# Patient Record
Sex: MALE | Race: WHITE | NOT HISPANIC OR LATINO | Employment: OTHER | URBAN - METROPOLITAN AREA
[De-identification: names, ages, dates, MRNs, and addresses within clinical notes are randomized per-mention and may not be internally consistent; named-entity substitution may affect disease eponyms.]

---

## 2017-01-01 ENCOUNTER — HOSPITAL ENCOUNTER (OUTPATIENT)
Dept: RADIOLOGY | Facility: HOSPITAL | Age: 82
Discharge: HOME | End: 2017-04-20
Attending: NURSE PRACTITIONER
Payer: MEDICARE

## 2017-01-01 ENCOUNTER — TELEPHONE (OUTPATIENT)
Dept: NEUROSURGERY | Facility: CLINIC | Age: 82
End: 2017-01-01

## 2017-01-01 ENCOUNTER — OFFICE VISIT (OUTPATIENT)
Dept: NEUROSURGERY | Facility: CLINIC | Age: 82
End: 2017-01-01
Payer: MEDICARE

## 2017-01-01 VITALS
BODY MASS INDEX: 18.56 KG/M2 | SYSTOLIC BLOOD PRESSURE: 112 MMHG | DIASTOLIC BLOOD PRESSURE: 59 MMHG | HEART RATE: 70 BPM | WEIGHT: 137 LBS | HEIGHT: 72 IN

## 2017-01-01 DIAGNOSIS — S06.5XAA SDH (SUBDURAL HEMATOMA): ICD-10-CM

## 2017-01-01 DIAGNOSIS — S06.5XAA SDH (SUBDURAL HEMATOMA): Primary | ICD-10-CM

## 2017-01-01 PROCEDURE — 70450 CT HEAD/BRAIN W/O DYE: CPT | Mod: 26,GW,, | Performed by: RADIOLOGY

## 2017-01-01 PROCEDURE — 70450 CT HEAD/BRAIN W/O DYE: CPT | Mod: TC,PO

## 2017-01-01 PROCEDURE — 99204 OFFICE O/P NEW MOD 45 MIN: CPT | Mod: S$GLB,,, | Performed by: NURSE PRACTITIONER

## 2017-01-13 PROBLEM — J18.9 PNEUMONIA OF RIGHT LOWER LOBE DUE TO INFECTIOUS ORGANISM: Status: ACTIVE | Noted: 2017-01-13

## 2017-01-16 PROBLEM — N40.1 BENIGN PROSTATIC HYPERPLASIA WITH LOWER URINARY TRACT SYMPTOMS: Chronic | Status: ACTIVE | Noted: 2017-01-16

## 2017-01-16 PROBLEM — N39.41 URGE INCONTINENCE: Chronic | Status: ACTIVE | Noted: 2017-01-16

## 2017-04-06 PROBLEM — S06.5XAA SDH (SUBDURAL HEMATOMA): Status: ACTIVE | Noted: 2017-01-01

## 2017-04-06 NOTE — PROGRESS NOTES
Neurosurgery History & Physical    Patient ID: Cristobal Lane is a 85 y.o. male.    Chief Complaint   Patient presents with    Neurologic Problem     Patient was being followed by Dr. Saleem for an SDH he acquired after a GLF this past February. He was told to schedule a follow up with Dr. Saleem but has been unable to reach him. He is here to establish care with us. Denies headaches or dizziness. He is unable to ambulate without assistance.        Review of Systems   Constitutional: Positive for activity change. Negative for appetite change, chills, fever and unexpected weight change.   HENT: Positive for hearing loss. Negative for tinnitus, trouble swallowing and voice change.    Respiratory: Negative for apnea, cough, chest tightness and shortness of breath.    Cardiovascular: Negative for chest pain and palpitations.   Gastrointestinal: Negative for diarrhea, nausea and vomiting.   Genitourinary: Negative for difficulty urinating, dysuria, frequency and urgency.   Musculoskeletal: Positive for gait problem. Negative for back pain, neck pain and neck stiffness.   Skin: Negative for wound.   Neurological: Positive for weakness. Negative for dizziness, tremors, seizures, facial asymmetry, speech difficulty, light-headedness, numbness and headaches.   Psychiatric/Behavioral: Negative for confusion and decreased concentration.       Past Medical History:   Diagnosis Date    Anticoagulant long-term use     Arthritis     CHF (congestive heart failure)     COPD (chronic obstructive pulmonary disease)     Coronary artery disease     Hypertension     Stroke     Thyroid disease      Social History     Social History    Marital status:      Spouse name: N/A    Number of children: N/A    Years of education: N/A     Occupational History    Not on file.     Social History Main Topics    Smoking status: Former Smoker     Packs/day: 1.50     Years: 15.00    Smokeless tobacco: Never Used    Alcohol use No  "   Drug use: No    Sexual activity: Not on file     Other Topics Concern    Not on file     Social History Narrative     History reviewed. No pertinent family history.  Review of patient's allergies indicates:   Allergen Reactions    Codeine Shortness Of Breath     "makes me crazy"    Niacin Itching    Niacin preparations        Current Outpatient Prescriptions:     b complex vitamins tablet, Take 1 tablet by mouth once daily., Disp: , Rfl:     calcitRIOL (ROCALTROL) 0.5 MCG Cap, Take 0.25 mcg by mouth once daily. , Disp: , Rfl:     CYANOCOBALAMIN, VITAMIN B-12, (VITAMIN B-12 ORAL), Take 1 tablet by mouth once daily. , Disp: , Rfl:     furosemide (LASIX) 40 MG tablet, Take 1 tablet (40 mg total) by mouth once daily., Disp: 30 tablet, Rfl: 11    guaifenesin (MUCINEX) 600 mg 12 hr tablet, Take 1 tablet (600 mg total) by mouth 2 (two) times daily., Disp: , Rfl:     levothyroxine (SYNTHROID) 75 MCG tablet, Take 1 tablet (75 mcg total) by mouth before breakfast., Disp: 30 tablet, Rfl: 11    magnesium oxide (MAG-OX) 400 mg tablet, Take 400 mg by mouth once daily., Disp: , Rfl:     memantine (NAMENDA) 10 MG Tab, Take 10 mg by mouth 3 (three) times daily. , Disp: , Rfl:     MULTIVITAMIN ORAL, Take 1 tablet by mouth once daily. , Disp: , Rfl:     oxybutynin (DITROPAN-XL) 5 MG TR24, Take 1 tablet (5 mg total) by mouth once daily., Disp: 30 tablet, Rfl: 3    senna (SENOKOT) 8.6 mg tablet, Take 1 tablet by mouth as needed for Constipation., Disp: , Rfl:     sertraline (ZOLOFT) 100 MG tablet, Take 100 mg by mouth every evening., Disp: , Rfl:     simvastatin (ZOCOR) 80 MG tablet, Take 80 mg by mouth every evening. Pt takes 1/2 tablet daily (20mg), Disp: , Rfl:     tamsulosin (FLOMAX) 0.4 mg Cp24, Take 0.4 mg by mouth once daily., Disp: , Rfl:   Blood pressure (!) 112/59, pulse 70, height 6' (1.829 m), weight 62.1 kg (137 lb).      Neurologic Exam     Mental Status   Oriented to person, place, and time. "   Attention: normal. Concentration: normal.   Speech: speech is normal   Level of consciousness: alert    Cranial Nerves     CN III, IV, VI   Pupils are equal, round, and reactive to light.  Right pupil: Size: 2 mm.   Left pupil: Size: 2 mm.        Grossly intact     Motor Exam   Muscle bulk: decreased  Overall muscle tone: normal  Right arm pronator drift: absent  Left arm pronator drift: absent    Strength   Right neck flexion: 4/5  Left neck flexion: 4/5  Right neck extension: 4/5  Left neck extension: 4/5  Right deltoid: 4/5  Left deltoid: 4/5  Right biceps: 4/5  Left biceps: 4/5  Right triceps: 4/5  Left triceps: 4/5  Right wrist flexion: 4/5  Left wrist flexion: 4/5  Right wrist extension: 4/5  Left wrist extension: 4/5  Right interossei: 4/5  Left interossei: 4/5  Right abdominals: 4/5  Left abdominals: 4/5  Right iliopsoas: 4/5  Left iliopsoas: 4/5  Right quadriceps: 4/5  Left quadriceps: 4/5  Right hamstrin/5  Left hamstrin/5  Right glutei: 4/5  Left glutei: 4/5  Right anterior tibial: 4/5  Left anterior tibial: 4/5  Right posterior tibial: 4/5  Left posterior tibial: 4/5  Right peroneal: 4/5  Left peroneal: 4/5  Right gastroc: 4/5  Left gastroc: 4/5    Sensory Exam   Light touch normal.     Gait, Coordination, and Reflexes     Reflexes   Right brachioradialis: 3+  Left brachioradialis: 3+  Right biceps: 3+  Left biceps: 3+  Right triceps: 3+  Left triceps: 3+  Right patellar: 3+  Left patellar: 3+  Right achilles: 3+  Left achilles: 3+  Right : 1+  Left : 3+       Ataxia  Wheelchair or walker assistance       Physical Exam   Constitutional: He is oriented to person, place, and time. He appears well-developed and well-nourished.   HENT:   Head: Normocephalic and atraumatic.   Eyes: Pupils are equal, round, and reactive to light.   Neck: Neck supple.   Pulmonary/Chest: Effort normal. No respiratory distress.   Musculoskeletal: He exhibits no deformity.   Neurological: He is oriented to  person, place, and time.   Reflex Scores:       Tricep reflexes are 3+ on the right side and 3+ on the left side.       Bicep reflexes are 3+ on the right side and 3+ on the left side.       Brachioradialis reflexes are 3+ on the right side and 3+ on the left side.       Patellar reflexes are 3+ on the right side and 3+ on the left side.       Achilles reflexes are 3+ on the right side and 3+ on the left side.  Skin: Skin is warm and dry.   Psychiatric: He has a normal mood and affect. His speech is normal.   Nursing note and vitals reviewed.    Provider dictation:  The patient is an 85 year-old male who was referred for follow-up of subdural hematoma. He was followed by Dr Saleem due to imaging findings of a SDH that was found following a ground-level fall. He has also been evaluated by Dr Ty Garcia, NEurology. He denies headache or dizziness but is very unsteady on his feet. He was hospitalized at [facility] for several days post-fall. His last CT head was 2 weeks ago. He has not been on any antiepileptic therapy and denies seizure activity. He was taking ASA prior to fall but this has been held since.     Repeat CT head reveals bilateral chronic hygroma with membrane formation, most pronounced on the left. No evidence of recurrent hemorrhage. There is age-appropriate cortical atrophy with hydrocephalus ex vacuo. No midline shift or herniation.     Overall the patient is doing fairly well and we would like to avoid proposing any surgical intervention. The hygroma could be partially drained via a single burrhole but may require craniotomy to remove membrane formation and ensure no recurrent hemorrhage post-evacuation. Given his age and current functional status, we recommend repeat CT head in 2 weeks time. We will review and determine further plan of care at that time. He has been instructed to avoid falls or repeat head trauma as this type of bleeding can re-occur. I recommend with-holding anticoagulants  indefinitely. He has been advised to seek medical evaluation if he suffers from repeat head trauma.     Cristobal Toure was seen today for neurologic problem.    Diagnoses and all orders for this visit:    SDH (subdural hematoma)  -     CT Head Without Contrast; Future

## 2017-04-06 NOTE — MR AVS SNAPSHOT
[facility] Neurosurgery  [address] [facility] [address]  [facility] [address]  Phone: [phone]  Fax: [phone]                  Cristobal Lane   2017 1:00 PM   Office Visit    Description:  Male : 1931   Provider:  Nataliia Jacob NP   Department:  [facility] - Neurosurgery           Reason for Visit     Neurologic Problem           Diagnoses this Visit        Comments    SDH (subdural hematoma)    -  Primary            To Do List           Future Appointments        Provider Department Dept Phone    2017 10:45 AM [facility] CT1 LIMIT [address] LBS [facility] [phone]      Goals (5 Years of Data)     None      [facility]     [facility] Nurse Care Line -  Assistance  Unless otherwise directed by your provider, please contact [facility], our nurse care line that is available for  assistance.     Registered nurses in the [facility] provide: appointment scheduling, clinical advisement, health education, and other advisory services.  Call: [phone] (toll free)               Medications           Message regarding Medications     Verify the changes and/or additions to your medication regime listed below are the same as discussed with your clinician today.  If any of these changes or additions are incorrect, please notify your healthcare provider.             Verify that the below list of medications is an accurate representation of the medications you are currently taking.  If none reported, the list may be blank. If incorrect, please contact your healthcare provider. Carry this list with you in case of emergency.           Current Medications     b complex vitamins tablet Take 1 tablet by mouth once daily.    calcitRIOL (ROCALTROL) 0.5 MCG Cap Take 0.25 mcg by mouth once daily.     CYANOCOBALAMIN, VITAMIN B-12, (VITAMIN B-12 ORAL) Take 1 tablet by mouth once daily.     furosemide (LASIX) 40 MG tablet Take 1 tablet (40 mg total) by mouth once  daily.    guaifenesin (MUCINEX) 600 mg 12 hr tablet Take 1 tablet (600 mg total) by mouth 2 (two) times daily.    levothyroxine (SYNTHROID) 75 MCG tablet Take 1 tablet (75 mcg total) by mouth before breakfast.    magnesium oxide (MAG-OX) 400 mg tablet Take 400 mg by mouth once daily.    memantine (NAMENDA) 10 MG Tab Take 10 mg by mouth 3 (three) times daily.     MULTIVITAMIN ORAL Take 1 tablet by mouth once daily.     oxybutynin (DITROPAN-XL) 5 MG TR24 Take 1 tablet (5 mg total) by mouth once daily.    senna (SENOKOT) 8.6 mg tablet Take 1 tablet by mouth as needed for Constipation.    sertraline (ZOLOFT) 100 MG tablet Take 100 mg by mouth every evening.    simvastatin (ZOCOR) 80 MG tablet Take 80 mg by mouth every evening. Pt takes 1/2 tablet daily (20mg)    tamsulosin (FLOMAX) 0.4 mg Cp24 Take 0.4 mg by mouth once daily.           Clinical Reference Information           Your Vitals Were     BP Pulse Height Weight BMI    112/59 70 6' (1.829 m) 62.1 kg (137 lb) 18.58 kg/m2      Blood Pressure          Most Recent Value    BP  (!)  112/59      Allergies as of 4/6/2017     Codeine    Niacin    Niacin Preparations      Immunizations Administered on Date of Encounter - 4/6/2017     None      Orders Placed During Today's Visit     Future Labs/Procedures Expected by Expires    CT Head Without Contrast  4/6/2017 4/6/2018      [facility] Sign-Up     Activating your [facility] account is as easy as 1-2-3!     1) Visit [email or web address], select Sign Up Now, enter this activation code and your date of birth, then select Next.  [email or web address]  Expires: 5/18/2017 10:28 AM      2) Create a username and password to use when you visit [facility] in the future and select a security question in case you lose your password and select Next.    3) Enter your e-mail address and click Sign Up!    Additional Information  If you have questions, please e-mail [email or web address] or call [phone] to talk to our [facility] staff. Remember,  [facility] is NOT to be used for urgent needs. For medical emergencies, dial 911.         Language Assistance Services     ATTENTION: Language assistance services are available, free of charge. Please call [phone].      ATENCIÓN: Si habla ada, tiene a simmons disposición servicios gratuitos de asistencia lingüística. Llame al [phone].     CHÚ Ý: N?u b?n nói Ti?ng Vi?t, có các d?ch v? h? tr? ngôn ng? mi?n phí dành cho b?n. G?i s? [phone].         [facility] complies with applicable Federal civil rights laws and does not discriminate on the basis of race, color, national origin, age, disability, or sex.
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
## 2017-04-21 NOTE — TELEPHONE ENCOUNTER
pts daughter Mrs. Damon called and would like to know the results of Ct head. Advised Mrs. Damon will discuss with Provider and give her a call back. Mrs. Damon verbalized understanding.

## 2017-04-28 NOTE — TELEPHONE ENCOUNTER
Pts daughter called requesting results of recent CT Head. Advised Mrs. Damon we will call her with results as soon as provider reviews the CT. Mrs. Damon verbalized understanding.

## 2017-04-28 NOTE — TELEPHONE ENCOUNTER
Dr. Reid -   Can you please review the images and make any further recommendations?    Thank you -   Nataliia Jin    He saw Nataliia Jacob 4-6-17    This is her note:  The patient is an 85 year-old male who was referred for follow-up of subdural hematoma. He was followed by Dr Saleem due to imaging findings of a SDH that was found following a ground-level fall. He has also been evaluated by Dr Ty Garcia, NEurology. He denies headache or dizziness but is very unsteady on his feet. He was hospitalized at [facility] for several days post-fall. His last CT head was 2 weeks ago. He has not been on any antiepileptic therapy and denies seizure activity. He was taking ASA prior to fall but this has been held since.      Repeat CT head reveals bilateral chronic hygroma with membrane formation, most pronounced on the left. No evidence of recurrent hemorrhage. There is age-appropriate cortical atrophy with hydrocephalus ex vacuo. No midline shift or herniation.      Overall the patient is doing fairly well and we would like to avoid proposing any surgical intervention. The hygroma could be partially drained via a single burrhole but may require craniotomy to remove membrane formation and ensure no recurrent hemorrhage post-evacuation. Given his age and current functional status, we recommend repeat CT head in 2 weeks time. We will review and determine further plan of care at that time. He has been instructed to avoid falls or repeat head trauma as this type of bleeding can re-occur. I recommend with-holding anticoagulants indefinitely. He has been advised to seek medical evaluation if he suffers from repeat head trauma.

## 2017-04-28 NOTE — TELEPHONE ENCOUNTER
I have asked Dr. Reid to review the images and he marc make any further recommendations.  We will let them know as soon as he reviews the images.

## 2017-05-01 NOTE — TELEPHONE ENCOUNTER
Dr. Reid has reviewed his new images.    At this time, no surgery is needed.    Recommend he prevent falls and not to take any blood thinners.

## 2017-06-14 PROBLEM — S09.90XA HEAD INJURY: Status: ACTIVE | Noted: 2017-01-01

## 2017-06-14 PROBLEM — R41.0 DELIRIUM: Status: ACTIVE | Noted: 2017-01-01

## 2017-06-15 PROBLEM — C44.209: Status: ACTIVE | Noted: 2017-01-01

## 2017-06-15 PROBLEM — J90 PLEURAL EFFUSION: Status: ACTIVE | Noted: 2017-01-01

## 2017-06-16 PROBLEM — I50.22 CHRONIC SYSTOLIC CONGESTIVE HEART FAILURE: Status: ACTIVE | Noted: 2017-01-01
